# Patient Record
Sex: FEMALE | Race: WHITE | NOT HISPANIC OR LATINO | Employment: UNEMPLOYED | URBAN - METROPOLITAN AREA
[De-identification: names, ages, dates, MRNs, and addresses within clinical notes are randomized per-mention and may not be internally consistent; named-entity substitution may affect disease eponyms.]

---

## 2017-03-30 RX ORDER — DORZOLAMIDE HYDROCHLORIDE AND TIMOLOL MALEATE 20; 5 MG/ML; MG/ML
SOLUTION/ DROPS OPHTHALMIC
Qty: 10 ML | Refills: 11 | Status: SHIPPED | OUTPATIENT
Start: 2017-03-30 | End: 2017-04-04 | Stop reason: SDUPTHER

## 2017-04-04 ENCOUNTER — OFFICE VISIT (OUTPATIENT)
Dept: OPHTHALMOLOGY | Facility: CLINIC | Age: 82
End: 2017-04-04
Payer: MEDICARE

## 2017-04-04 ENCOUNTER — APPOINTMENT (OUTPATIENT)
Dept: OPHTHALMOLOGY | Facility: CLINIC | Age: 82
End: 2017-04-04
Payer: MEDICARE

## 2017-04-04 DIAGNOSIS — H40.1133 PRIMARY OPEN ANGLE GLAUCOMA OF BOTH EYES, SEVERE STAGE: Primary | ICD-10-CM

## 2017-04-04 DIAGNOSIS — Z96.1 PSEUDOPHAKIA OF BOTH EYES: ICD-10-CM

## 2017-04-04 PROCEDURE — 92133 CPTRZD OPH DX IMG PST SGM ON: CPT | Mod: PBBFAC,PO | Performed by: OPHTHALMOLOGY

## 2017-04-04 PROCEDURE — 92012 INTRM OPH EXAM EST PATIENT: CPT | Mod: S$PBB,,, | Performed by: OPHTHALMOLOGY

## 2017-04-04 PROCEDURE — 99999 PR PBB SHADOW E&M-EST. PATIENT-LVL II: CPT | Mod: PBBFAC,,, | Performed by: OPHTHALMOLOGY

## 2017-04-04 PROCEDURE — 99212 OFFICE O/P EST SF 10 MIN: CPT | Mod: PBBFAC,PO | Performed by: OPHTHALMOLOGY

## 2017-04-04 PROCEDURE — 92083 EXTENDED VISUAL FIELD XM: CPT | Mod: PBBFAC,PO | Performed by: OPHTHALMOLOGY

## 2017-04-04 RX ORDER — LATANOPROST 50 UG/ML
1 SOLUTION/ DROPS OPHTHALMIC NIGHTLY
Qty: 1 BOTTLE | Refills: 11 | Status: SHIPPED | OUTPATIENT
Start: 2017-04-04 | End: 2017-06-30 | Stop reason: SDUPTHER

## 2017-04-04 RX ORDER — DORZOLAMIDE HYDROCHLORIDE AND TIMOLOL MALEATE 20; 5 MG/ML; MG/ML
1 SOLUTION/ DROPS OPHTHALMIC 2 TIMES DAILY
Qty: 10 ML | Refills: 11 | Status: SHIPPED | OUTPATIENT
Start: 2017-04-04 | End: 2018-05-01 | Stop reason: SDUPTHER

## 2017-04-04 NOTE — PROGRESS NOTES
HPI     Glaucoma    Additional comments: 4 month HVF/GOCT, Latanoprost QHS OS, Cosopt BID OS           Comments   Pt states no changes since her last visit. Has been compliant with her   drops. Will need refills on both. No ocular pain or irritation. Vision is   about the same, seeing well with no correction.    PCIOL OD CANAL WITHOUT STENT 1/8/15 +21.5 CDE 21.38  PCIOL OS 09/25/14 +22.5WF/CDE 35.31 -CP not attempted due to unstable   chamber intraoperatively     PTERY. REMOVAL OD W/ AMNIOTIC MEMBRANE 08/07/14  DRY EYES  Refresh BID OU    COAG  BV OS     Latanoprost QHS OS, Dorz-Miquel BID OS       Last edited by Kemal Abrams on 4/4/2017 11:13 AM. (History)            Assessment /Plan     For exam results, see Encounter Report.      ICD-10-CM ICD-9-CM    1. Primary open angle glaucoma of both eyes, severe stage H40.1133 365.11 Blanchard Visual Field - OU - Extended - Both Eyes     365.73 Posterior Segment OCT Optic Nerve- Both eyes Done today   Doing well - intraocular pressure is within acceptable range relative to target pressure with no evidence of progression.   Continue current treatment.  Reviewed importance of continued compliance with treatment and follow up.                  2. Pseudophakia of both eyes Z96.1 V43.1         Latanoprost QHS OS, Cosopt BID OS       RETURN TO CLINIC 4 month IOP

## 2017-06-30 DIAGNOSIS — H40.1133 PRIMARY OPEN ANGLE GLAUCOMA OF BOTH EYES, SEVERE STAGE: ICD-10-CM

## 2017-06-30 RX ORDER — LATANOPROST 50 UG/ML
SOLUTION/ DROPS OPHTHALMIC
Qty: 2.5 ML | Refills: 6 | Status: SHIPPED | OUTPATIENT
Start: 2017-06-30 | End: 2017-08-08 | Stop reason: SDUPTHER

## 2017-08-08 ENCOUNTER — OFFICE VISIT (OUTPATIENT)
Dept: OPHTHALMOLOGY | Facility: CLINIC | Age: 82
End: 2017-08-08
Payer: MEDICARE

## 2017-08-08 DIAGNOSIS — H04.123 DRY EYE SYNDROME, BILATERAL: ICD-10-CM

## 2017-08-08 DIAGNOSIS — H40.1111 PRIMARY OPEN ANGLE GLAUCOMA OF RIGHT EYE, MILD STAGE: ICD-10-CM

## 2017-08-08 DIAGNOSIS — Z96.1 PSEUDOPHAKIA OF BOTH EYES: ICD-10-CM

## 2017-08-08 DIAGNOSIS — H40.1123 PRIMARY OPEN ANGLE GLAUCOMA OF LEFT EYE, SEVERE STAGE: Primary | ICD-10-CM

## 2017-08-08 PROCEDURE — 92012 INTRM OPH EXAM EST PATIENT: CPT | Mod: S$PBB,,, | Performed by: OPHTHALMOLOGY

## 2017-08-08 PROCEDURE — 99212 OFFICE O/P EST SF 10 MIN: CPT | Mod: PBBFAC,PO | Performed by: OPHTHALMOLOGY

## 2017-08-08 PROCEDURE — 99999 PR PBB SHADOW E&M-EST. PATIENT-LVL II: CPT | Mod: PBBFAC,,, | Performed by: OPHTHALMOLOGY

## 2017-08-08 RX ORDER — LATANOPROST 50 UG/ML
1 SOLUTION/ DROPS OPHTHALMIC NIGHTLY
Qty: 2.5 ML | Refills: 6 | Status: SHIPPED | OUTPATIENT
Start: 2017-08-08 | End: 2018-08-15 | Stop reason: SDUPTHER

## 2017-08-08 NOTE — PROGRESS NOTES
HPI     Glaucoma    Additional comments: Latanoprost qhs OS and Dorz/Timolol BID % cx,   Refresh prn OU           Comments   4 month glaucoma check (IOP check)    Patient states that she needs a refill on her Latanoprost eye drops.  No new eye changes since patient's last visit    PCIOL OD CANAL WITHOUT STENT 1/8/15 +21.5 CDE 21.38  PCIOL OS 09/25/14 +22.5WF/CDE 35.31 -CP not attempted due to unstable   chamber intraoperatively     PTERY. REMOVAL OD W/ AMNIOTIC MEMBRANE 08/07/14  DRY EYES  Refresh BID OU    COAG  BV OS     Latanoprost QHS OS, Dorz-Miquel BID OS  100% cx       Last edited by Kenyatta Link on 8/8/2017  9:58 AM. (History)            Assessment /Plan     For exam results, see Encounter Report.      ICD-10-CM ICD-9-CM    1. Primary open angle glaucoma of left eye, severe stage H40.1123 365.11 latanoprost 0.005 % ophthalmic solution  Doing well - intraocular pressure is within acceptable range relative to target pressure with no evidence of progression.   Continue current treatment.  Reviewed importance of continued compliance with treatment and follow up.        365.73    2. Primary open angle glaucoma of right eye, mild stage H40.1111 365.11 Doing well - intraocular pressure is within acceptable range relative to target pressure with no evidence of progression.   Continue current treatment.  Reviewed importance of continued compliance with treatment and follow up.        365.71    3. Pseudophakia of both eyes Z96.1 V43.1 stable   4. Dry eye syndrome, bilateral H04.123 375.15 Continue artificial tears prn OU     Continue Latanoprost qhs OS and Dorzolamide/Timolol OS BID      RETURN TO CLINIC in 4 to 5 months for dilation and SDP's

## 2018-01-23 ENCOUNTER — OFFICE VISIT (OUTPATIENT)
Dept: OPHTHALMOLOGY | Facility: CLINIC | Age: 83
End: 2018-01-23
Payer: MEDICARE

## 2018-01-23 DIAGNOSIS — H40.1111 PRIMARY OPEN ANGLE GLAUCOMA OF RIGHT EYE, MILD STAGE: ICD-10-CM

## 2018-01-23 DIAGNOSIS — H04.123 DRY EYE SYNDROME, BILATERAL: ICD-10-CM

## 2018-01-23 DIAGNOSIS — Z96.1 PSEUDOPHAKIA OF BOTH EYES: ICD-10-CM

## 2018-01-23 DIAGNOSIS — H40.1123 PRIMARY OPEN ANGLE GLAUCOMA OF LEFT EYE, SEVERE STAGE: Primary | ICD-10-CM

## 2018-01-23 PROCEDURE — 99999 PR PBB SHADOW E&M-EST. PATIENT-LVL I: CPT | Mod: PBBFAC,,, | Performed by: OPHTHALMOLOGY

## 2018-01-23 PROCEDURE — 99211 OFF/OP EST MAY X REQ PHY/QHP: CPT | Mod: PBBFAC,PO,25 | Performed by: OPHTHALMOLOGY

## 2018-01-23 PROCEDURE — 92250 FUNDUS PHOTOGRAPHY W/I&R: CPT | Mod: PBBFAC,PO | Performed by: OPHTHALMOLOGY

## 2018-01-23 PROCEDURE — 92014 COMPRE OPH EXAM EST PT 1/>: CPT | Mod: S$PBB,,, | Performed by: OPHTHALMOLOGY

## 2018-01-23 NOTE — PROGRESS NOTES
HPI     Here for IOP check, FD and SDPs.    PCIOL OD CANAL WITHOUT STENT 1/8/15 +21.5 CDE 21.38  PCIOL OS 09/25/14 +22.5WF/CDE 35.31 -CP not attempted due to unstable   chamber intraoperatively     PTERY. REMOVAL OD W/ AMNIOTIC MEMBRANE 08/07/14  DRY EYES  Refresh BID OU    COAG (Mild OD) (Severe OS)  BV OS     Latanoprost QHS OS, Dorz-Miquel BID OS    Last edited by Svetlana Carrington on 1/23/2018  9:55 AM. (History)            Assessment /Plan     For exam results, see Encounter Report.      ICD-10-CM ICD-9-CM    1. Primary open angle glaucoma of left eye, severe stage H40.1123 365.11 Color Fundus Photography - OU - Both Eyes    Doing well - intraocular pressure is within acceptable range relative to target pressure with no evidence of progression.   Continue current treatment.  Reviewed importance of continued compliance with treatment and follow up.        365.73    2. Primary open angle glaucoma of right eye, mild stage H40.1111 365.11 Stable as above     365.71    3. Pseudophakia of both eyes Z96.1 V43.1 stable   4. Dry eye syndrome, bilateral H04.123 375.15 Continue tears     Latanoprost QHS OS, Dorz-Miquel BID OS   Return to clinic 4 months with IOP check.

## 2018-05-01 DIAGNOSIS — H40.1133 PRIMARY OPEN ANGLE GLAUCOMA OF BOTH EYES, SEVERE STAGE: ICD-10-CM

## 2018-05-01 RX ORDER — DORZOLAMIDE HYDROCHLORIDE AND TIMOLOL MALEATE 20; 5 MG/ML; MG/ML
1 SOLUTION/ DROPS OPHTHALMIC 2 TIMES DAILY
Qty: 10 ML | Refills: 6 | Status: SHIPPED | OUTPATIENT
Start: 2018-05-01 | End: 2019-06-05 | Stop reason: SDUPTHER

## 2018-05-01 NOTE — TELEPHONE ENCOUNTER
----- Message from Mackenzie Naranjo sent at 5/1/2018  1:22 PM CDT -----  Contact: daughter/Joanna   Daughter states pt need a refill on eye drops, Dorzolamide, called into [facility] pharmacy, if any questions call Joanna @ [phone].

## 2018-05-01 NOTE — TELEPHONE ENCOUNTER
----- Message from Mackenzie Naranjo sent at 5/1/2018  1:22 PM CDT -----  Contact: daughter/Joanna   Daughter states pt need a refill on eye drops, Dorzolamide, called into [facility] pharmacy, if any questions call Joanna @ [phone].

## 2018-05-22 ENCOUNTER — OFFICE VISIT (OUTPATIENT)
Dept: OPHTHALMOLOGY | Facility: CLINIC | Age: 83
End: 2018-05-22
Payer: MEDICARE

## 2018-05-22 DIAGNOSIS — Z96.1 PSEUDOPHAKIA OF BOTH EYES: ICD-10-CM

## 2018-05-22 DIAGNOSIS — H40.1123 PRIMARY OPEN ANGLE GLAUCOMA OF LEFT EYE, SEVERE STAGE: Primary | ICD-10-CM

## 2018-05-22 DIAGNOSIS — H40.1111 PRIMARY OPEN ANGLE GLAUCOMA OF RIGHT EYE, MILD STAGE: ICD-10-CM

## 2018-05-22 PROCEDURE — 99999 PR PBB SHADOW E&M-EST. PATIENT-LVL II: CPT | Mod: PBBFAC,,, | Performed by: OPHTHALMOLOGY

## 2018-05-22 PROCEDURE — 92012 INTRM OPH EXAM EST PATIENT: CPT | Mod: S$PBB,,, | Performed by: OPHTHALMOLOGY

## 2018-05-22 PROCEDURE — 99212 OFFICE O/P EST SF 10 MIN: CPT | Mod: PBBFAC,PO | Performed by: OPHTHALMOLOGY

## 2018-05-22 NOTE — PROGRESS NOTES
HPI     Glaucoma    Additional comments: 4 mth IOP check            Comments   PCIOL OD CANAL WITHOUT STENT 1/8/15 +21.5 CDE 21.38  PCIOL OS 09/25/14 +22.5WF/CDE 35.31 -CP not attempted due to unstable   chamber intraoperatively     PTERY. REMOVAL OD W/ AMNIOTIC MEMBRANE 08/07/14  DRY EYES  Refresh BID OU    COAG (Mild OD) (Severe OS)  BV OS     Latanoprost QHS OS, Dorz-Miquel BID OS       Last edited by Mulu Mejia MA on 5/22/2018  9:57 AM. (History)            Assessment /Plan     For exam results, see Encounter Report.      ICD-10-CM ICD-9-CM    1. Primary open angle glaucoma of left eye, severe stage H40.1123 365.11 Doing well - intraocular pressure is within acceptable range relative to target pressure with no evidence of progression.   Continue current treatment.  Reviewed importance of continued compliance with treatment and follow up.        365.73    2. Primary open angle glaucoma of right eye, mild stage H40.1111 365.11 Doing well - intraocular pressure is within acceptable range relative to target pressure with no evidence of progression.   Continue current treatment.  Reviewed importance of continued compliance with treatment and follow up.        365.71    3. Pseudophakia of both eyes Z96.1 V43.1 Stable.      Latanoprost QHS OS, Dorz-Miquel BID OS   Return to clinic 4 months with HVF, IOP check, and GOCT

## 2018-08-15 DIAGNOSIS — H40.1123 PRIMARY OPEN ANGLE GLAUCOMA OF LEFT EYE, SEVERE STAGE: ICD-10-CM

## 2018-08-15 RX ORDER — LATANOPROST 50 UG/ML
SOLUTION/ DROPS OPHTHALMIC
Qty: 2.5 ML | Refills: 6 | Status: SHIPPED | OUTPATIENT
Start: 2018-08-15 | End: 2019-09-06 | Stop reason: SDUPTHER

## 2018-09-05 ENCOUNTER — OFFICE VISIT (OUTPATIENT)
Dept: OPHTHALMOLOGY | Facility: CLINIC | Age: 83
End: 2018-09-05
Payer: MEDICARE

## 2018-09-05 DIAGNOSIS — H40.1123 PRIMARY OPEN ANGLE GLAUCOMA (POAG) OF LEFT EYE, SEVERE STAGE: Primary | ICD-10-CM

## 2018-09-05 DIAGNOSIS — Z96.1 PSEUDOPHAKIA OF BOTH EYES: ICD-10-CM

## 2018-09-05 DIAGNOSIS — H04.123 DRY EYE SYNDROME, BILATERAL: ICD-10-CM

## 2018-09-05 DIAGNOSIS — H40.1111 PRIMARY OPEN ANGLE GLAUCOMA OF RIGHT EYE, MILD STAGE: ICD-10-CM

## 2018-09-05 PROCEDURE — 92083 EXTENDED VISUAL FIELD XM: CPT | Mod: PBBFAC,PO | Performed by: OPHTHALMOLOGY

## 2018-09-05 PROCEDURE — 92012 INTRM OPH EXAM EST PATIENT: CPT | Mod: S$PBB,,, | Performed by: OPHTHALMOLOGY

## 2018-09-05 PROCEDURE — 99212 OFFICE O/P EST SF 10 MIN: CPT | Mod: PBBFAC,PO | Performed by: OPHTHALMOLOGY

## 2018-09-05 PROCEDURE — 99999 PR PBB SHADOW E&M-EST. PATIENT-LVL II: CPT | Mod: PBBFAC,,, | Performed by: OPHTHALMOLOGY

## 2018-09-05 PROCEDURE — 92133 CPTRZD OPH DX IMG PST SGM ON: CPT | Mod: PBBFAC,PO | Performed by: OPHTHALMOLOGY

## 2018-09-05 NOTE — PROGRESS NOTES
HPI     Glaucoma      Additional comments: Latanoprost QHS OS, Dorz-Miquel BID OS              Comments     Pt here for 4m HVF GOCT chk. No pain or discomfort. VA stable. 100%   compliant with gtts.     PCIOL OD CANAL WITHOUT STENT 1/8/15 +21.5 CDE 21.38  PCIOL OS 09/25/14 +22.5WF/CDE 35.31 -CP not attempted due to unstable   chamber intraoperatively     PTERY. REMOVAL OD W/ AMNIOTIC MEMBRANE 08/07/14  DRY EYES  Refresh BID OU    COAG (Mild OD) (Severe OS)  BV OS     Latanoprost QHS OS, Dorz-Miquel BID OS          Last edited by Alex Giles, Patient Care Assistant on 9/5/2018 10:25   AM. (History)            Assessment /Plan     For exam results, see Encounter Report.      ICD-10-CM ICD-9-CM    1. Primary open angle glaucoma (POAG) of left eye, severe stage H40.1123 365.11 Blanchard Visual Field - OU - Extended - Both Eyes     365.73 Posterior Segment OCT Optic Nerve- Both eyes    Doing well - intraocular pressure is within acceptable range relative to target pressure with no evidence of progression.   Continue current treatment.  Reviewed importance of continued compliance with treatment and follow up.      2. Primary open angle glaucoma of right eye, mild stage H40.1111 365.11 Stable as above      365.71    3. Pseudophakia of both eyes Z96.1 V43.1 Stable    4. Dry eye syndrome, bilateral H04.123 375.15 Well      Latanoprost QHS OS, Dorz-Miquel BID OS  Return to clinic 4 months with dilation and sdp's

## 2019-01-16 ENCOUNTER — OFFICE VISIT (OUTPATIENT)
Dept: OPHTHALMOLOGY | Facility: CLINIC | Age: 84
End: 2019-01-16
Payer: MEDICARE

## 2019-01-16 DIAGNOSIS — H26.493 PCO (POSTERIOR CAPSULAR OPACIFICATION), BILATERAL: ICD-10-CM

## 2019-01-16 DIAGNOSIS — H40.1111 PRIMARY OPEN ANGLE GLAUCOMA OF RIGHT EYE, MILD STAGE: ICD-10-CM

## 2019-01-16 DIAGNOSIS — Z96.1 PSEUDOPHAKIA OF BOTH EYES: ICD-10-CM

## 2019-01-16 DIAGNOSIS — H40.1123 PRIMARY OPEN ANGLE GLAUCOMA (POAG) OF LEFT EYE, SEVERE STAGE: Primary | ICD-10-CM

## 2019-01-16 DIAGNOSIS — H04.123 DRY EYE SYNDROME, BILATERAL: ICD-10-CM

## 2019-01-16 PROCEDURE — 99211 OFF/OP EST MAY X REQ PHY/QHP: CPT | Mod: PBBFAC,PN,25 | Performed by: OPHTHALMOLOGY

## 2019-01-16 PROCEDURE — 92014 PR EYE EXAM, EST PATIENT,COMPREHESV: ICD-10-PCS | Mod: S$PBB,,, | Performed by: OPHTHALMOLOGY

## 2019-01-16 PROCEDURE — 92250 FUNDUS PHOTOGRAPHY W/I&R: CPT | Mod: PBBFAC,PN | Performed by: OPHTHALMOLOGY

## 2019-01-16 PROCEDURE — 99999 PR PBB SHADOW E&M-EST. PATIENT-LVL I: ICD-10-PCS | Mod: PBBFAC,,, | Performed by: OPHTHALMOLOGY

## 2019-01-16 PROCEDURE — 99999 PR PBB SHADOW E&M-EST. PATIENT-LVL I: CPT | Mod: PBBFAC,,, | Performed by: OPHTHALMOLOGY

## 2019-01-16 PROCEDURE — 92250 COLOR FUNDUS PHOTOGRAPHY - OU - BOTH EYES: ICD-10-PCS | Mod: 26,S$PBB,, | Performed by: OPHTHALMOLOGY

## 2019-01-16 PROCEDURE — 92014 COMPRE OPH EXAM EST PT 1/>: CPT | Mod: S$PBB,,, | Performed by: OPHTHALMOLOGY

## 2019-01-16 RX ORDER — CLINDAMYCIN HYDROCHLORIDE 300 MG/1
300 CAPSULE ORAL 4 TIMES DAILY
Refills: 0 | COMMUNITY
Start: 2019-01-11 | End: 2019-09-17

## 2019-01-16 RX ORDER — DOXYCYCLINE HYCLATE 100 MG
100 TABLET ORAL 2 TIMES DAILY
Refills: 0 | COMMUNITY
Start: 2019-01-14 | End: 2020-04-29 | Stop reason: ALTCHOICE

## 2019-01-16 NOTE — PROGRESS NOTES
HPI     Glaucoma      Additional comments: Latanoprost QHS OS, Dorz-Miquel BID OS              Comments     Pt here for 4m SDP chk. No pain or discomfort. VA stable. 100% compliant   with gtts.     PCIOL OD CANAL WITHOUT STENT 1/8/15 +21.5 CDE 21.38  PCIOL OS 09/25/14 +22.5WF/CDE 35.31 -CP not attempted due to unstable   chamber intraoperatively     PTERY. REMOVAL OD W/ AMNIOTIC MEMBRANE 08/07/14  DRY EYES  Refresh BID OU    COAG (Mild OD) (Severe OS)  BV OS     Latanoprost QHS OS, Dorz-Miquel BID OS          Last edited by Alex Giles, Patient Care Assistant on 1/16/2019 10:18   AM. (History)            Assessment /Plan     For exam results, see Encounter Report.      ICD-10-CM ICD-9-CM    1. Primary open angle glaucoma (POAG) of left eye, severe stage H40.1123 365.11 Color Fundus Photography - OU - Both Eyes Done today   Doing well - intraocular pressure is within acceptable range relative to target pressure with no evidence of progression.   Continue current treatment.  Reviewed importance of continued compliance with treatment and follow up.        365.73    2. Primary open angle glaucoma of right eye, mild stage H40.1111 365.11 See above      365.71    3. Pseudophakia of both eyes Z96.1 V43.1 Well    4. Dry eye syndrome, bilateral H04.123 375.15    5. PCO (posterior capsular opacification), bilateral H26.493 366.50 Follow at this time        RETURN TO CLINIC 4 month IOP, GOCT    Latanoprost QHS OS, Dorz-Miquel BID OS

## 2019-06-05 DIAGNOSIS — H40.1133 PRIMARY OPEN ANGLE GLAUCOMA OF BOTH EYES, SEVERE STAGE: ICD-10-CM

## 2019-06-05 RX ORDER — DORZOLAMIDE HYDROCHLORIDE AND TIMOLOL MALEATE 20; 5 MG/ML; MG/ML
SOLUTION/ DROPS OPHTHALMIC
Qty: 10 ML | Refills: 6 | Status: SHIPPED | OUTPATIENT
Start: 2019-06-05 | End: 2019-09-06 | Stop reason: SDUPTHER

## 2019-06-11 ENCOUNTER — OFFICE VISIT (OUTPATIENT)
Dept: OPHTHALMOLOGY | Facility: CLINIC | Age: 84
End: 2019-06-11
Payer: MEDICARE

## 2019-06-11 DIAGNOSIS — H04.123 DRY EYE SYNDROME, BILATERAL: ICD-10-CM

## 2019-06-11 DIAGNOSIS — Z96.1 PSEUDOPHAKIA OF BOTH EYES: ICD-10-CM

## 2019-06-11 DIAGNOSIS — H40.1111 PRIMARY OPEN ANGLE GLAUCOMA OF RIGHT EYE, MILD STAGE: ICD-10-CM

## 2019-06-11 DIAGNOSIS — H40.1123 PRIMARY OPEN ANGLE GLAUCOMA (POAG) OF LEFT EYE, SEVERE STAGE: Primary | ICD-10-CM

## 2019-06-11 PROCEDURE — 92012 PR EYE EXAM, EST PATIENT,INTERMED: ICD-10-PCS | Mod: S$PBB,,, | Performed by: OPHTHALMOLOGY

## 2019-06-11 PROCEDURE — 99211 OFF/OP EST MAY X REQ PHY/QHP: CPT | Mod: PBBFAC,25 | Performed by: OPHTHALMOLOGY

## 2019-06-11 PROCEDURE — 92133 POSTERIOR SEGMENT OCT OPTIC NERVE(OCULAR COHERENCE TOMOGRAPHY) - OU - BOTH EYES: ICD-10-PCS | Mod: 26,S$PBB,, | Performed by: OPHTHALMOLOGY

## 2019-06-11 PROCEDURE — 99999 PR PBB SHADOW E&M-EST. PATIENT-LVL I: CPT | Mod: PBBFAC,,, | Performed by: OPHTHALMOLOGY

## 2019-06-11 PROCEDURE — 92012 INTRM OPH EXAM EST PATIENT: CPT | Mod: S$PBB,,, | Performed by: OPHTHALMOLOGY

## 2019-06-11 PROCEDURE — 99999 PR PBB SHADOW E&M-EST. PATIENT-LVL I: ICD-10-PCS | Mod: PBBFAC,,, | Performed by: OPHTHALMOLOGY

## 2019-06-11 PROCEDURE — 92133 CPTRZD OPH DX IMG PST SGM ON: CPT | Mod: PBBFAC | Performed by: OPHTHALMOLOGY

## 2019-06-11 RX ORDER — BRIMONIDINE TARTRATE 2 MG/ML
1 SOLUTION/ DROPS OPHTHALMIC 2 TIMES DAILY
Qty: 10 ML | Refills: 6 | Status: SHIPPED | OUTPATIENT
Start: 2019-06-11 | End: 2019-09-17

## 2019-06-11 NOTE — PROGRESS NOTES
HPI     Glaucoma      Additional comments: 4 Months IOP CHECK & GOCT              Comments     Patient states no visual complaints & no discomfort. 100% drops   compliance     PCIOL OD CANAL WITHOUT STENT 1/8/15 +21.5 CDE 21.38  PCIOL OS 09/25/14 +22.5WF/CDE 35.31 -CP not attempted due to unstable   chamber intraoperatively     PTERY. REMOVAL OD W/ AMNIOTIC MEMBRANE 08/07/14  DRY EYES  Refresh BID OU    COAG (Mild OD) (Severe OS)  BV OS     Latanoprost QHS OS, Dorz-Miquel BID OS          Last edited by Dilcia Dia on 6/11/2019 11:32 AM. (History)            Assessment /Plan     For exam results, see Encounter Report.      ICD-10-CM ICD-9-CM    1. Primary open angle glaucoma (POAG) of left eye, severe stage H40.1123 365.11 Posterior Segment OCT Optic Nerve- Both eyes  Done today   Would like IOP Lower OS since she's had decrease vision-   [place] pole was normal today on exam   Will add another med      365.73    2. Primary open angle glaucoma of right eye, mild stage H40.1111 365.11 Posterior Segment OCT Optic Nerve- Both eyes      365.71    3. Pseudophakia of both eyes Z96.1 V43.1    4. Dry eye syndrome, bilateral H04.123 375.15          Latanoprost QHS OS, Dorz-Miquel BID OS   Add Brimonidine BID OS     RETURN TO CLINIC 3 month IOP

## 2019-09-06 DIAGNOSIS — H40.1133 PRIMARY OPEN ANGLE GLAUCOMA OF BOTH EYES, SEVERE STAGE: ICD-10-CM

## 2019-09-06 DIAGNOSIS — H40.1123 PRIMARY OPEN ANGLE GLAUCOMA OF LEFT EYE, SEVERE STAGE: ICD-10-CM

## 2019-09-06 RX ORDER — DORZOLAMIDE HYDROCHLORIDE AND TIMOLOL MALEATE 20; 5 MG/ML; MG/ML
1 SOLUTION/ DROPS OPHTHALMIC 2 TIMES DAILY
Qty: 10 ML | Refills: 6 | Status: SHIPPED | OUTPATIENT
Start: 2019-09-06 | End: 2020-08-14 | Stop reason: SDUPTHER

## 2019-09-06 RX ORDER — LATANOPROST 50 UG/ML
1 SOLUTION/ DROPS OPHTHALMIC NIGHTLY
Qty: 2.5 ML | Refills: 6 | Status: SHIPPED | OUTPATIENT
Start: 2019-09-06 | End: 2020-08-14 | Stop reason: SDUPTHER

## 2019-09-06 NOTE — TELEPHONE ENCOUNTER
----- Message from Mackenzie Naranjo sent at 9/6/2019  1:56 PM CDT -----  Contact: pt  Please call pt daughter @ [phone] regarding pt eye drop medication, state pt need a refill.

## 2019-09-06 NOTE — TELEPHONE ENCOUNTER
----- Message from Mark Ochoa sent at 9/6/2019  1:47 PM CDT -----  Contact: joanna   .Type:  RX Refill Request    Who Called: Joanna  Refill or New Rx: refill   RX Name and Strength dorzolamide-timolol 2-0.5% (COSOPT) 22.3-6.8 mg/mL ophthalmic solution  How is the patient currently taking it? (ex. 1XDay)   Is this a 30 day or 90 day RX:   Preferred Pharmacy with phone number: [facility]   Local or Mail Order: local   Ordering Provider avi   Would the patient rather a call back or a response via [facility]?  no  Best Call Back Number: ..[phone]   Additional Information:             ..  [facility] [place]  [address]  [address] [address]  [address] [address] [place] [address]  Phone: [phone] Fax: [phone]

## 2019-09-17 ENCOUNTER — OFFICE VISIT (OUTPATIENT)
Dept: OPHTHALMOLOGY | Facility: CLINIC | Age: 84
End: 2019-09-17
Payer: MEDICARE

## 2019-09-17 DIAGNOSIS — Z96.1 PSEUDOPHAKIA OF BOTH EYES: ICD-10-CM

## 2019-09-17 DIAGNOSIS — H40.1123 PRIMARY OPEN ANGLE GLAUCOMA (POAG) OF LEFT EYE, SEVERE STAGE: Primary | ICD-10-CM

## 2019-09-17 DIAGNOSIS — H40.1111 PRIMARY OPEN ANGLE GLAUCOMA OF RIGHT EYE, MILD STAGE: ICD-10-CM

## 2019-09-17 PROCEDURE — 99999 PR PBB SHADOW E&M-EST. PATIENT-LVL I: CPT | Mod: PBBFAC,,, | Performed by: OPHTHALMOLOGY

## 2019-09-17 PROCEDURE — 92012 PR EYE EXAM, EST PATIENT,INTERMED: ICD-10-PCS | Mod: S$PBB,,, | Performed by: OPHTHALMOLOGY

## 2019-09-17 PROCEDURE — 92012 INTRM OPH EXAM EST PATIENT: CPT | Mod: S$PBB,,, | Performed by: OPHTHALMOLOGY

## 2019-09-17 PROCEDURE — 99999 PR PBB SHADOW E&M-EST. PATIENT-LVL I: ICD-10-PCS | Mod: PBBFAC,,, | Performed by: OPHTHALMOLOGY

## 2019-09-17 PROCEDURE — 99211 OFF/OP EST MAY X REQ PHY/QHP: CPT | Mod: PBBFAC | Performed by: OPHTHALMOLOGY

## 2019-09-17 NOTE — PROGRESS NOTES
HPI     Patient returns for a 3 month iop check, patient states she is 100%   compliant with drop usage.    PCIOL OD CANAL WITHOUT STENT 1/8/15 +21.5 CDE 21.38  PCIOL OS 09/25/14 +22.5WF/CDE 35.31 -CP not attempted due to unstable   chamber intraoperatively     PTERY. REMOVAL OD W/ AMNIOTIC MEMBRANE 08/07/14  DRY EYES  Refresh BID OU    COAG (Mild OD) (Severe OS)  BV OS     Latanoprost QHS OS, Dorz-Miquel BID OS  Brimonodine BID OS     Last edited by GRACIE Pina on 9/17/2019 11:20 AM. (History)            Assessment /Plan     For exam results, see Encounter Report.      ICD-10-CM ICD-9-CM    1. Primary open angle glaucoma (POAG) of left eye, severe stage H40.1123 365.11 No response to brimonidine OS. Will d/c     sg     365.73    2. Primary open angle glaucoma of right eye, mild stage H40.1111 365.11 Doing well - intraocular pressure is within acceptable range relative to target pressure with no evidence of progression.   Continue current treatment.  Reviewed importance of continued compliance with treatment and follow up.        365.71    3. Pseudophakia of both eyes Z96.1 V43.1 Stable        D/c brimonidine  Add rhopressa qd OS-rx sent to  pharmacy today for PA.  Latanoprost QHS OS, Dorz-Miquel BID OS      Return to clinic 3 weeks with IOP check

## 2019-10-08 ENCOUNTER — OFFICE VISIT (OUTPATIENT)
Dept: OPHTHALMOLOGY | Facility: CLINIC | Age: 84
End: 2019-10-08
Payer: MEDICARE

## 2019-10-08 DIAGNOSIS — H40.1123 PRIMARY OPEN ANGLE GLAUCOMA OF LEFT EYE, SEVERE STAGE: ICD-10-CM

## 2019-10-08 DIAGNOSIS — H40.1111 PRIMARY OPEN ANGLE GLAUCOMA OF RIGHT EYE, MILD STAGE: ICD-10-CM

## 2019-10-08 DIAGNOSIS — Z96.1 PSEUDOPHAKIA OF BOTH EYES: ICD-10-CM

## 2019-10-08 DIAGNOSIS — H40.1123 PRIMARY OPEN ANGLE GLAUCOMA (POAG) OF LEFT EYE, SEVERE STAGE: Primary | ICD-10-CM

## 2019-10-08 PROCEDURE — 92012 INTRM OPH EXAM EST PATIENT: CPT | Mod: S$PBB,,, | Performed by: OPHTHALMOLOGY

## 2019-10-08 PROCEDURE — 92012 PR EYE EXAM, EST PATIENT,INTERMED: ICD-10-PCS | Mod: S$PBB,,, | Performed by: OPHTHALMOLOGY

## 2019-10-08 PROCEDURE — 99999 PR PBB SHADOW E&M-EST. PATIENT-LVL I: CPT | Mod: PBBFAC,,, | Performed by: OPHTHALMOLOGY

## 2019-10-08 PROCEDURE — 99999 PR PBB SHADOW E&M-EST. PATIENT-LVL I: ICD-10-PCS | Mod: PBBFAC,,, | Performed by: OPHTHALMOLOGY

## 2019-10-08 PROCEDURE — 99211 OFF/OP EST MAY X REQ PHY/QHP: CPT | Mod: PBBFAC | Performed by: OPHTHALMOLOGY

## 2019-10-08 NOTE — PROGRESS NOTES
HPI     Patient returns for a 3 week iop check, patient was d/c on Brimonidine   last visit and trialed on Rhopressa patient states she has been 100%   compliant with drop usage, and Rhopressa has been approved by   [employer]through 12/31/2020.      PCIOL OD CANAL WITHOUT STENT 1/8/15 +21.5 CDE 21.38  PCIOL OS 09/25/14 +22.5WF/CDE 35.31 -CP not attempted due to unstable   chamber intraoperatively     PTERY. REMOVAL OD W/ AMNIOTIC MEMBRANE 08/07/14  DRY EYES  Refresh BID OU    COAG (Mild OD) (Severe OS)  BV OS     Latanoprost QHS OS, Dorz-Miquel BID OS , Rhopressa QD       Last edited by GRACIE Pina on 10/8/2019 10:43 AM. (History)            Assessment /Plan     For exam results, see Encounter Report.      ICD-10-CM ICD-9-CM    1. Primary open angle glaucoma (POAG) of left eye, severe stage H40.1123 365.11 Good response to rhopressa, continue meds      365.73    2. Primary open angle glaucoma of right eye, mild stage H40.1111 365.11      365.71    3. Pseudophakia of both eyes Z96.1 V43.1    4. Primary open angle glaucoma of left eye, severe stage H40.1123 365.11      365.73          Latanoprost QHS OS, Dorz-Miquel BID OS , Rhopressa QD     RETURN TO CLINIC 3 months DOA

## 2020-01-14 ENCOUNTER — TELEPHONE (OUTPATIENT)
Dept: OPHTHALMOLOGY | Facility: CLINIC | Age: 85
End: 2020-01-14

## 2020-01-14 NOTE — TELEPHONE ENCOUNTER
----- Message from Mackenzie Naranjo sent at 1/14/2020  7:42 AM CST -----  Contact: pt  Joanna ( Pt daughter ) is requesting a call from nurse to get a refill on pt eye drops. ( Dr Levar Thomas office ) .   Joanna ( Pt daughter ) states she will need to speak with provider about the medication.     Please call  Joanna ( Pt daughter ) [phone]         .   [facility]  [place] - [address] [address]   [address] [address] [address]   Phone: [phone] Fax: [phone]

## 2020-02-19 ENCOUNTER — OFFICE VISIT (OUTPATIENT)
Dept: OPHTHALMOLOGY | Facility: CLINIC | Age: 85
End: 2020-02-19
Payer: MEDICARE

## 2020-02-19 DIAGNOSIS — H40.1111 PRIMARY OPEN ANGLE GLAUCOMA OF RIGHT EYE, MILD STAGE: ICD-10-CM

## 2020-02-19 DIAGNOSIS — H04.123 DRY EYE SYNDROME, BILATERAL: ICD-10-CM

## 2020-02-19 DIAGNOSIS — Z96.1 PSEUDOPHAKIA OF BOTH EYES: ICD-10-CM

## 2020-02-19 DIAGNOSIS — H40.1123 PRIMARY OPEN ANGLE GLAUCOMA (POAG) OF LEFT EYE, SEVERE STAGE: Primary | ICD-10-CM

## 2020-02-19 DIAGNOSIS — H35.30 AMD (AGE-RELATED MACULAR DEGENERATION), BILATERAL: ICD-10-CM

## 2020-02-19 PROCEDURE — 99999 PR PBB SHADOW E&M-EST. PATIENT-LVL I: ICD-10-PCS | Mod: PBBFAC,,, | Performed by: OPHTHALMOLOGY

## 2020-02-19 PROCEDURE — 92012 INTRM OPH EXAM EST PATIENT: CPT | Mod: S$PBB,,, | Performed by: OPHTHALMOLOGY

## 2020-02-19 PROCEDURE — 92133 CPTRZD OPH DX IMG PST SGM ON: CPT | Mod: PBBFAC | Performed by: OPHTHALMOLOGY

## 2020-02-19 PROCEDURE — 92012 PR EYE EXAM, EST PATIENT,INTERMED: ICD-10-PCS | Mod: S$PBB,,, | Performed by: OPHTHALMOLOGY

## 2020-02-19 PROCEDURE — 92133 POSTERIOR SEGMENT OCT OPTIC NERVE(OCULAR COHERENCE TOMOGRAPHY) - OU - BOTH EYES: ICD-10-PCS | Mod: 26,S$PBB,, | Performed by: OPHTHALMOLOGY

## 2020-02-19 PROCEDURE — 99999 PR PBB SHADOW E&M-EST. PATIENT-LVL I: CPT | Mod: PBBFAC,,, | Performed by: OPHTHALMOLOGY

## 2020-02-19 PROCEDURE — 99211 OFF/OP EST MAY X REQ PHY/QHP: CPT | Mod: PBBFAC | Performed by: OPHTHALMOLOGY

## 2020-02-19 NOTE — PROGRESS NOTES
HPI     Patient returns for a 4 month iop check , patient had to cancel her   01/14/20 appointment due to illness , patient was given new rx for   Rhopressa and instructed to continue her other drops, patient is almost   out of Rhopressa. Patient states she is 100% compliant with usage.        PCIOL OD CANAL WITHOUT STENT 1/8/15 +21.5 CDE 21.38  PCIOL OS 09/25/14 +22.5WF/CDE 35.31 -CP not attempted due to unstable   chamber intraoperatively     PTERY. REMOVAL OD W/ AMNIOTIC MEMBRANE 08/07/14  DRY EYES  Refresh BID OU    COAG (Mild OD) (Severe OS)  BV OS     Latanoprost QHS OS, Dorz-Miquel BID OS , Rhopressa QD    Last edited by Bronson Thomas MD on 2/19/2020  1:31 PM. (History)            Assessment /Plan     For exam results, see Encounter Report.      ICD-10-CM ICD-9-CM    1. Primary open angle glaucoma (POAG) of left eye, severe stage H40.1123 365.11 IOP lower with Rhopressa, needs to continue      365.73    2. Primary open angle glaucoma of right eye, mild stage H40.1111 365.11 Follow      365.71    3. Pseudophakia of both eyes Z96.1 V43.1 Well    4. Dry eye syndrome, bilateral H04.123 375.15 Appears stable -follow    5. AMD (age-related macular degeneration), bilateral H35.30 362.50 Exam consistent with moderate age related macular degeneration with elevated risk findings. Discussed clinical condition and recommend avoidance of tobacco products.  Patient instructed in the use of daily Amsler Grid, AREDS II formula. Patient advised to call immediately if any Amsler Grid / visual changes occur.            RETURN TO CLINIC 3  month IOP       Latanoprost QHS OS, Dorz-Miquel BID OS , Rhopressa QD

## 2020-04-30 ENCOUNTER — TELEPHONE (OUTPATIENT)
Dept: OPHTHALMOLOGY | Facility: CLINIC | Age: 85
End: 2020-04-30

## 2020-04-30 NOTE — TELEPHONE ENCOUNTER
Spoke with pt's daughter Joanna, and advised that she should continue all glaucoma meds.  She was seen in urgent care last night and got stitches.  She has had no double vision or decreased vision since the fall, and very little pain.  An xray was done, and it was determined that she has no broken bones.  Advised Joanna to call if any problems occur.

## 2020-04-30 NOTE — TELEPHONE ENCOUNTER
----- Message from Crissy Paige sent at 4/30/2020  8:45 AM CDT -----  Contact: daughter(hayley) [phone]  Pt daughter called to consult with nurse about her mother (Manju) having a red bruised eye due to recent fall and she now have stiches. They will like to know if they should continue to use eye drops. Please return call at  thanks ar

## 2020-05-19 ENCOUNTER — OFFICE VISIT (OUTPATIENT)
Dept: OPHTHALMOLOGY | Facility: CLINIC | Age: 85
End: 2020-05-19
Payer: MEDICARE

## 2020-05-19 DIAGNOSIS — H35.30 AMD (AGE-RELATED MACULAR DEGENERATION), BILATERAL: ICD-10-CM

## 2020-05-19 DIAGNOSIS — Z96.1 PSEUDOPHAKIA OF BOTH EYES: ICD-10-CM

## 2020-05-19 DIAGNOSIS — H40.1123 PRIMARY OPEN ANGLE GLAUCOMA (POAG) OF LEFT EYE, SEVERE STAGE: Primary | ICD-10-CM

## 2020-05-19 DIAGNOSIS — H40.1111 PRIMARY OPEN ANGLE GLAUCOMA OF RIGHT EYE, MILD STAGE: ICD-10-CM

## 2020-05-19 DIAGNOSIS — H04.123 DRY EYE SYNDROME, BILATERAL: ICD-10-CM

## 2020-05-19 PROCEDURE — 99999 PR PBB SHADOW E&M-EST. PATIENT-LVL I: CPT | Mod: PBBFAC,,, | Performed by: OPHTHALMOLOGY

## 2020-05-19 PROCEDURE — 92012 INTRM OPH EXAM EST PATIENT: CPT | Mod: S$PBB,,, | Performed by: OPHTHALMOLOGY

## 2020-05-19 PROCEDURE — 99999 PR PBB SHADOW E&M-EST. PATIENT-LVL I: ICD-10-PCS | Mod: PBBFAC,,, | Performed by: OPHTHALMOLOGY

## 2020-05-19 PROCEDURE — 92012 PR EYE EXAM, EST PATIENT,INTERMED: ICD-10-PCS | Mod: S$PBB,,, | Performed by: OPHTHALMOLOGY

## 2020-05-19 PROCEDURE — 99211 OFF/OP EST MAY X REQ PHY/QHP: CPT | Mod: PBBFAC | Performed by: OPHTHALMOLOGY

## 2020-05-19 NOTE — PROGRESS NOTES
HPI     Patient returns for a 3 month iop check, patient is using all her drops   once daily.        PCIOL OD CANAL WITHOUT STENT 1/8/15 +21.5 CDE 21.38  PCIOL OS 09/25/14 +22.5WF/CDE 35.31 -CP not attempted due to unstable   chamber intraoperatively     PTERY. REMOVAL OD W/ AMNIOTIC MEMBRANE 08/07/14  DRY EYES  Refresh BID OU    COAG (Mild OD) (Severe OS)  BV OS     Latanoprost QHS OS, Dorz-Miquel BID OS , ( Patient is using once daily )   Rhopressa QD    Last edited by GRACIE Pina on 5/19/2020 10:54 AM. (History)            Assessment /Plan     For exam results, see Encounter Report.      ICD-10-CM ICD-9-CM    1. Primary open angle glaucoma (POAG) of left eye, severe stage H40.1123 365.11 Doing well - intraocular pressure is within acceptable range relative to target pressure with no evidence of progression.   Continue current treatment.  Reviewed importance of continued compliance with treatment and follow up.        365.73    2. Primary open angle glaucoma of right eye, mild stage H40.1111 365.11 Stable as above      365.71    3. Pseudophakia of both eyes Z96.1 V43.1 Stable    4. Dry eye syndrome, bilateral H04.123 375.15 Well    5. AMD (age-related macular degeneration), bilateral H35.30 362.50 Dilated 2/19/20      Latanoprost QHS OS, Dorz-Miquel BID OS   Rhopressa QD OS     Return to clinic 6 months with IOP check

## 2020-07-02 ENCOUNTER — TELEPHONE (OUTPATIENT)
Dept: OPHTHALMOLOGY | Facility: CLINIC | Age: 85
End: 2020-07-02

## 2020-07-02 NOTE — TELEPHONE ENCOUNTER
Spoke with daughter concerning patient's Rhopressa and I informed her I would speak with Dr. Thomas when he returns in the pm.

## 2020-07-02 NOTE — TELEPHONE ENCOUNTER
----- Message from Bushra Kendall sent at 7/2/2020 11:30 AM CDT -----  Regarding: Patient's Daughter-Joanna  The caller would like to consult with nurse in regards to the patient's eye drops. Please call back at [phone] (home)

## 2020-07-07 ENCOUNTER — TELEPHONE (OUTPATIENT)
Dept: OPHTHALMOLOGY | Facility: CLINIC | Age: 85
End: 2020-07-07

## 2020-08-14 DIAGNOSIS — H40.1123 PRIMARY OPEN ANGLE GLAUCOMA OF LEFT EYE, SEVERE STAGE: ICD-10-CM

## 2020-08-14 DIAGNOSIS — H40.1133 PRIMARY OPEN ANGLE GLAUCOMA OF BOTH EYES, SEVERE STAGE: ICD-10-CM

## 2020-08-14 DIAGNOSIS — H40.1123 PRIMARY OPEN ANGLE GLAUCOMA (POAG) OF LEFT EYE, SEVERE STAGE: ICD-10-CM

## 2020-08-14 RX ORDER — DORZOLAMIDE HYDROCHLORIDE AND TIMOLOL MALEATE 20; 5 MG/ML; MG/ML
1 SOLUTION/ DROPS OPHTHALMIC 2 TIMES DAILY
Qty: 10 ML | Refills: 6 | Status: SHIPPED | OUTPATIENT
Start: 2020-08-14 | End: 2020-11-12

## 2020-08-14 RX ORDER — LATANOPROST 50 UG/ML
1 SOLUTION/ DROPS OPHTHALMIC NIGHTLY
Qty: 2.5 ML | Refills: 6 | Status: SHIPPED | OUTPATIENT
Start: 2020-08-14 | End: 2020-11-12

## 2020-08-14 RX ORDER — NETARSUDIL 0.2 MG/ML
1 SOLUTION/ DROPS OPHTHALMIC; TOPICAL DAILY
Qty: 7.5 ML | Refills: 6 | Status: SHIPPED | OUTPATIENT
Start: 2020-08-14 | End: 2020-11-12

## 2021-07-01 ENCOUNTER — PATIENT MESSAGE (OUTPATIENT)
Dept: ADMINISTRATIVE | Facility: OTHER | Age: 86
End: 2021-07-01